# Patient Record
Sex: MALE | Race: WHITE
[De-identification: names, ages, dates, MRNs, and addresses within clinical notes are randomized per-mention and may not be internally consistent; named-entity substitution may affect disease eponyms.]

---

## 2018-07-12 ENCOUNTER — HOSPITAL ENCOUNTER (EMERGENCY)
Dept: HOSPITAL 89 - ER | Age: 33
Discharge: HOME | End: 2018-07-12
Payer: COMMERCIAL

## 2018-07-12 VITALS — SYSTOLIC BLOOD PRESSURE: 126 MMHG | DIASTOLIC BLOOD PRESSURE: 81 MMHG

## 2018-07-12 VITALS — SYSTOLIC BLOOD PRESSURE: 95 MMHG | DIASTOLIC BLOOD PRESSURE: 52 MMHG

## 2018-07-12 DIAGNOSIS — R07.89: Primary | ICD-10-CM

## 2018-07-12 DIAGNOSIS — M79.602: ICD-10-CM

## 2018-07-12 DIAGNOSIS — M54.5: ICD-10-CM
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
## 2018-07-12 DIAGNOSIS — M25.512: Primary | ICD-10-CM

## 2018-07-12 LAB — PLATELET COUNT, AUTOMATED: 147 K/UL (ref 150–450)

## 2018-07-12 PROCEDURE — 90715 TDAP VACCINE 7 YRS/> IM: CPT

## 2018-07-12 PROCEDURE — 82565 ASSAY OF CREATININE: CPT

## 2018-07-12 PROCEDURE — 84450 TRANSFERASE (AST) (SGOT): CPT

## 2018-07-12 PROCEDURE — 82247 BILIRUBIN TOTAL: CPT

## 2018-07-12 PROCEDURE — 99285 EMERGENCY DEPT VISIT HI MDM: CPT

## 2018-07-12 PROCEDURE — 82040 ASSAY OF SERUM ALBUMIN: CPT

## 2018-07-12 PROCEDURE — 85025 COMPLETE CBC W/AUTO DIFF WBC: CPT

## 2018-07-12 PROCEDURE — 84295 ASSAY OF SERUM SODIUM: CPT

## 2018-07-12 PROCEDURE — 84460 ALANINE AMINO (ALT) (SGPT): CPT

## 2018-07-12 PROCEDURE — 82947 ASSAY GLUCOSE BLOOD QUANT: CPT

## 2018-07-12 PROCEDURE — 84155 ASSAY OF PROTEIN SERUM: CPT

## 2018-07-12 PROCEDURE — 82374 ASSAY BLOOD CARBON DIOXIDE: CPT

## 2018-07-12 PROCEDURE — 70450 CT HEAD/BRAIN W/O DYE: CPT

## 2018-07-12 PROCEDURE — 84520 ASSAY OF UREA NITROGEN: CPT

## 2018-07-12 PROCEDURE — 82435 ASSAY OF BLOOD CHLORIDE: CPT

## 2018-07-12 PROCEDURE — 82310 ASSAY OF CALCIUM: CPT

## 2018-07-12 PROCEDURE — 80320 DRUG SCREEN QUANTALCOHOLS: CPT

## 2018-07-12 PROCEDURE — 84075 ASSAY ALKALINE PHOSPHATASE: CPT

## 2018-07-12 PROCEDURE — 36415 COLL VENOUS BLD VENIPUNCTURE: CPT

## 2018-07-12 PROCEDURE — 72100 X-RAY EXAM L-S SPINE 2/3 VWS: CPT

## 2018-07-12 PROCEDURE — 84132 ASSAY OF SERUM POTASSIUM: CPT

## 2018-07-12 PROCEDURE — 71046 X-RAY EXAM CHEST 2 VIEWS: CPT

## 2018-07-12 PROCEDURE — 90471 IMMUNIZATION ADMIN: CPT

## 2018-07-12 PROCEDURE — 99283 EMERGENCY DEPT VISIT LOW MDM: CPT

## 2018-07-12 PROCEDURE — 72125 CT NECK SPINE W/O DYE: CPT

## 2018-07-12 NOTE — RADIOLOGY IMAGING REPORT
FACILITY: [facility] - [place] 

 

PATIENT NAME: Nathaly Mendosa

: 1985

MR: [id number]

V: [id number]

EXAM DATE: 

ORDERING PHYSICIAN: CHIP SHANE

TECHNOLOGIST: 

 

Location: [facility]

Patient: Nathaly Mendosa

: 1985

MRN: [id number]

Visit/Account:[id number]

Date of Sevice:  2018

 

ACCESSION #: [id number]

 

C-SPINE W/O CONTRAST

 

HISTORY:  Trauma

 

COMPARISON STUDIES:  none

 

TECHNIQUE:  Axial images were obtained from the skull base through the upper thoracic spine without i
ntravenous contrast. Coronal and sagittal reformatted images were obtained from the axial source data
. One of the following dose optimization techniques was utilized in the performance of this exam: aut
omated exposure control; adjustment of the mA and/or kv according to patient size; or use of iterativ
e reconstruction technique. Specific details can be referenced in the facility's radiology CT exam op
erational policy.

 

FINDINGS:

Pre-vertebral soft tissues: Negative

Fracture/alignment: negative

Vertebral bodies: Negative

Posterior elements: Negative

Disc Spaces: Negative

 

Visualized soft tissues anterior neck: Negative

Visualized lung / mediastinum: Negative

Other findings: None significant

 

IMPRESSION:

 

1. Negative for acute fracture or spondylolisthesis.

 

Report Dictated By: Aguila Isbell MD at 2018 5:03 AM

 

Report E-Signed By: Aguila Isbell MD  at 2018 5:05 AM

 

[id number]

## 2018-07-12 NOTE — RADIOLOGY IMAGING REPORT
FACILITY: [facility] 

 

PATIENT NAME: Nathaly Mendosa

: 1985

MR: [id number]

V: [id number]

EXAM DATE: 

ORDERING PHYSICIAN: CHIP SHANE

TECHNOLOGIST: 

 

Location: [facility]

Patient: Nathaly Mendosa

: 1985

MRN: [id number]

Visit/Account:[id number]

Date of Sevice:  2018

 

ACCESSION #: [id number]

 

HUMERUS LEFT

 

HISTORY: Trauma

 

COMPARISON: None

 

FINDINGS: No humeral shaft fracture. No persistent radiopaque foreign body.

 

 

IMPRESSION:

 

1. No acute osseous abnormality.

 

Report Dictated By: Aguila Isbell MD at 2018 4:51 AM

 

Report E-Signed By: Aguila Isbell MD  at 2018 4:51 AM

 

[id number]
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
## 2018-07-12 NOTE — RADIOLOGY IMAGING REPORT
FACILITY: [facility] 

 

PATIENT NAME: Nathaly Mendosa

: 1985

MR: [id number]

V: [id number]

EXAM DATE: 

ORDERING PHYSICIAN: CHIP SHANE

TECHNOLOGIST: 

 

Location: [facility] - [place]

Patient: Nathaly Mendosa

: 1985

MRN: [id number]

Visit/Account:[id number]

Date of Sevice:  2018

 

ACCESSION #: [id number]

 

SHOULDER MIN 2 VIEWS LEFT

 

HISTORY: trauma

 

COMPARISON: None

 

FINDINGS:

 

Left shoulder: There is no evidence of acute fracture or dislocation.  Glenohumeral joint is normal. 
 The acromioclavicular joint is normal in appearance.

 

IMPRESSION:

 

1. No acute osseous abnormality.

 

Report Dictated By: Aguila Isbell MD at 2018 4:50 AM

 

Report E-Signed By: Aguila Isbell MD  at 2018 4:51 AM

 

[id number]
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
